# Patient Record
Sex: FEMALE | Race: BLACK OR AFRICAN AMERICAN | NOT HISPANIC OR LATINO
[De-identification: names, ages, dates, MRNs, and addresses within clinical notes are randomized per-mention and may not be internally consistent; named-entity substitution may affect disease eponyms.]

---

## 2023-07-21 PROBLEM — Z00.00 ENCOUNTER FOR PREVENTIVE HEALTH EXAMINATION: Status: ACTIVE | Noted: 2023-07-21

## 2023-08-09 PROBLEM — Z86.19 HISTORY OF HELICOBACTER PYLORI INFECTION: Status: RESOLVED | Noted: 2023-08-09 | Resolved: 2023-08-09

## 2023-08-09 PROBLEM — Z86.018 HISTORY OF UTERINE LEIOMYOMA: Status: RESOLVED | Noted: 2023-08-09 | Resolved: 2023-08-09

## 2023-08-09 PROBLEM — Z86.79 HISTORY OF HYPERTENSION: Status: RESOLVED | Noted: 2023-08-09 | Resolved: 2023-08-09

## 2023-08-09 PROBLEM — Z87.19 HISTORY OF GASTROESOPHAGEAL REFLUX (GERD): Status: RESOLVED | Noted: 2023-08-09 | Resolved: 2023-08-09

## 2023-08-09 PROBLEM — Z78.9 NON-SMOKER: Status: ACTIVE | Noted: 2023-08-09

## 2023-08-09 PROBLEM — Z86.59 HISTORY OF ANXIETY: Status: RESOLVED | Noted: 2023-08-09 | Resolved: 2023-08-09

## 2023-08-09 RX ORDER — CETIRIZINE HYDROCHLORIDE 10 MG/1
10 TABLET, COATED ORAL
Refills: 0 | Status: ACTIVE | COMMUNITY

## 2023-08-09 RX ORDER — FLUOXETINE HYDROCHLORIDE 20 MG/1
20 CAPSULE ORAL
Refills: 0 | Status: ACTIVE | COMMUNITY

## 2023-08-09 RX ORDER — LABETALOL HYDROCHLORIDE 100 MG/1
100 TABLET, FILM COATED ORAL
Refills: 0 | Status: ACTIVE | COMMUNITY

## 2023-08-09 RX ORDER — PANTOPRAZOLE 40 MG/1
40 TABLET, DELAYED RELEASE ORAL
Qty: 30 | Refills: 2 | Status: ACTIVE | COMMUNITY

## 2023-08-09 RX ORDER — AMLODIPINE BESYLATE 5 MG/1
5 TABLET ORAL
Refills: 0 | Status: ACTIVE | COMMUNITY

## 2023-08-10 NOTE — HISTORY OF PRESENT ILLNESS
[de-identified] : 47 year old female presents for an initial consultation for iron-deficiency anemia in setting of menorrhea, despite taking oral supplements.  She is referred by her PCP Amari Hudson for IV iron.  6/18/21: Hgb 9.5, MCV 84.5, MCHC 31 7/30/21: ferritin 3, iron 44, TIBC 481, transferrin sat 9% 2/1/22: Hgb 9.3, MCV 82.1, MCHC 30.7, ferritin 2, iron 30, TIBC 471, transferrin sat 6% 6/7/23: Hgb 9.9, MCV 83.3, MCHC 31, ferritin 2, iron 32, TIBC 448, transferrin sat 7%

## 2023-08-11 ENCOUNTER — APPOINTMENT (OUTPATIENT)
Dept: HEMATOLOGY ONCOLOGY | Facility: CLINIC | Age: 43
End: 2023-08-11
Payer: MEDICAID

## 2023-08-11 DIAGNOSIS — Z86.018 PERSONAL HISTORY OF OTHER BENIGN NEOPLASM: ICD-10-CM

## 2023-08-11 DIAGNOSIS — Z78.9 OTHER SPECIFIED HEALTH STATUS: ICD-10-CM

## 2023-08-11 DIAGNOSIS — Z86.59 PERSONAL HISTORY OF OTHER MENTAL AND BEHAVIORAL DISORDERS: ICD-10-CM

## 2023-08-11 DIAGNOSIS — Z86.19 PERSONAL HISTORY OF OTHER INFECTIOUS AND PARASITIC DISEASES: ICD-10-CM

## 2023-08-11 DIAGNOSIS — Z86.79 PERSONAL HISTORY OF OTHER DISEASES OF THE CIRCULATORY SYSTEM: ICD-10-CM

## 2023-08-11 DIAGNOSIS — Z87.19 PERSONAL HISTORY OF OTHER DISEASES OF THE DIGESTIVE SYSTEM: ICD-10-CM

## 2023-10-12 ENCOUNTER — LABORATORY RESULT (OUTPATIENT)
Age: 43
End: 2023-10-12

## 2023-10-12 ENCOUNTER — APPOINTMENT (OUTPATIENT)
Dept: HEMATOLOGY ONCOLOGY | Facility: CLINIC | Age: 43
End: 2023-10-12
Payer: MEDICAID

## 2023-10-12 VITALS
OXYGEN SATURATION: 100 % | HEART RATE: 71 BPM | RESPIRATION RATE: 18 BRPM | TEMPERATURE: 98.6 F | DIASTOLIC BLOOD PRESSURE: 98 MMHG | SYSTOLIC BLOOD PRESSURE: 154 MMHG | WEIGHT: 170 LBS

## 2023-10-12 VITALS — HEIGHT: 68 IN | BODY MASS INDEX: 25.85 KG/M2

## 2023-10-12 LAB
FERRITIN SERPL-MCNC: 5 NG/ML
HAPTOGLOB SERPL-MCNC: 46 MG/DL
IRON SATN MFR SERPL: 4 %
IRON SERPL-MCNC: 18 UG/DL
LDH SERPL-CCNC: 209 U/L
RBC # BLD: 3.62 M/UL
RETICS # AUTO: 1.8 %
RETICS AGGREG/RBC NFR: 65.2 K/UL
TIBC SERPL-MCNC: 435 UG/DL
UIBC SERPL-MCNC: 417 UG/DL

## 2023-10-12 PROCEDURE — 99204 OFFICE O/P NEW MOD 45 MIN: CPT

## 2023-10-13 LAB
ALBUMIN SERPL ELPH-MCNC: 3.5 G/DL
ALP BLD-CCNC: 51 U/L
ALT SERPL-CCNC: 12 U/L
ANION GAP SERPL CALC-SCNC: 4 MMOL/L
AST SERPL-CCNC: 24 U/L
BILIRUB SERPL-MCNC: 0.7 MG/DL
BUN SERPL-MCNC: 13 MG/DL
CALCIUM SERPL-MCNC: 9.3 MG/DL
CHLORIDE SERPL-SCNC: 110 MMOL/L
CO2 SERPL-SCNC: 25 MMOL/L
CREAT SERPL-MCNC: 0.7 MG/DL
EGFR: 111 ML/MIN/1.73M2
FOLATE SERPL-MCNC: 11.3 NG/ML
GLUCOSE SERPL-MCNC: 88 MG/DL
POTASSIUM SERPL-SCNC: 4.1 MMOL/L
PROT SERPL-MCNC: 7.2 G/DL
SODIUM SERPL-SCNC: 139 MMOL/L
VIT B12 SERPL-MCNC: 912 PG/ML

## 2023-10-19 RX ORDER — IRON SUCROSE 20 MG/ML
200 INJECTION, SOLUTION INTRAVENOUS ONCE
Refills: 0 | Status: COMPLETED | OUTPATIENT
Start: 2023-11-02 | End: 2023-11-02

## 2023-10-19 RX ORDER — IRON SUCROSE 20 MG/ML
200 INJECTION, SOLUTION INTRAVENOUS ONCE
Refills: 0 | Status: COMPLETED | OUTPATIENT
Start: 2023-10-26 | End: 2023-10-26

## 2023-10-19 RX ORDER — IRON SUCROSE 20 MG/ML
200 INJECTION, SOLUTION INTRAVENOUS ONCE
Refills: 0 | Status: COMPLETED | OUTPATIENT
Start: 2023-10-20 | End: 2023-10-20

## 2023-10-19 RX ORDER — IRON SUCROSE 20 MG/ML
200 INJECTION, SOLUTION INTRAVENOUS ONCE
Refills: 0 | Status: COMPLETED | OUTPATIENT
Start: 2023-12-07 | End: 2023-12-07

## 2023-10-19 RX ORDER — IRON SUCROSE 20 MG/ML
200 INJECTION, SOLUTION INTRAVENOUS ONCE
Refills: 0 | Status: COMPLETED | OUTPATIENT
Start: 2023-11-10 | End: 2023-11-10

## 2023-10-20 ENCOUNTER — OUTPATIENT (OUTPATIENT)
Dept: OUTPATIENT SERVICES | Facility: HOSPITAL | Age: 43
LOS: 1 days | End: 2023-10-20
Payer: MEDICAID

## 2023-10-20 ENCOUNTER — APPOINTMENT (OUTPATIENT)
Dept: INFUSION THERAPY | Facility: CLINIC | Age: 43
End: 2023-10-20

## 2023-10-20 DIAGNOSIS — D50.9 IRON DEFICIENCY ANEMIA, UNSPECIFIED: ICD-10-CM

## 2023-10-20 PROCEDURE — 96365 THER/PROPH/DIAG IV INF INIT: CPT

## 2023-10-20 RX ADMIN — IRON SUCROSE 110 MILLIGRAM(S): 20 INJECTION, SOLUTION INTRAVENOUS at 09:40

## 2023-10-20 RX ADMIN — IRON SUCROSE 200 MILLIGRAM(S): 20 INJECTION, SOLUTION INTRAVENOUS at 10:40

## 2023-10-26 ENCOUNTER — OUTPATIENT (OUTPATIENT)
Dept: OUTPATIENT SERVICES | Facility: HOSPITAL | Age: 43
LOS: 1 days | End: 2023-10-26
Payer: MEDICAID

## 2023-10-26 ENCOUNTER — APPOINTMENT (OUTPATIENT)
Dept: INFUSION THERAPY | Facility: CLINIC | Age: 43
End: 2023-10-26

## 2023-10-26 VITALS
OXYGEN SATURATION: 99 % | SYSTOLIC BLOOD PRESSURE: 146 MMHG | TEMPERATURE: 98 F | RESPIRATION RATE: 18 BRPM | DIASTOLIC BLOOD PRESSURE: 94 MMHG | HEART RATE: 78 BPM

## 2023-10-26 VITALS
SYSTOLIC BLOOD PRESSURE: 122 MMHG | OXYGEN SATURATION: 99 % | RESPIRATION RATE: 18 BRPM | TEMPERATURE: 98 F | DIASTOLIC BLOOD PRESSURE: 78 MMHG | HEART RATE: 71 BPM

## 2023-10-26 DIAGNOSIS — D50.9 IRON DEFICIENCY ANEMIA, UNSPECIFIED: ICD-10-CM

## 2023-10-26 PROCEDURE — 96365 THER/PROPH/DIAG IV INF INIT: CPT

## 2023-10-26 RX ADMIN — IRON SUCROSE 200 MILLIGRAM(S): 20 INJECTION, SOLUTION INTRAVENOUS at 16:11

## 2023-10-26 RX ADMIN — IRON SUCROSE 110 MILLIGRAM(S): 20 INJECTION, SOLUTION INTRAVENOUS at 15:11

## 2023-10-26 NOTE — DISCHARGE INSTRUCTIONS: GENERAL THERAPY - NSAPPTSFOLLOWUP_HEME_A_AMB
[facility]... [facility]... [facility]...

## 2023-10-26 NOTE — DISCHARGE INSTRUCTIONS: GENERAL THERAPY - NSFACILITYCONTAFTRHOUR_HEME_A_AMB
[facility] ([phone]) [facility] ([phone]) [facility] ([phone])

## 2023-11-02 ENCOUNTER — OUTPATIENT (OUTPATIENT)
Dept: OUTPATIENT SERVICES | Facility: HOSPITAL | Age: 43
LOS: 1 days | End: 2023-11-02
Payer: MEDICAID

## 2023-11-02 ENCOUNTER — APPOINTMENT (OUTPATIENT)
Dept: INFUSION THERAPY | Facility: CLINIC | Age: 43
End: 2023-11-02

## 2023-11-02 DIAGNOSIS — D50.9 IRON DEFICIENCY ANEMIA, UNSPECIFIED: ICD-10-CM

## 2023-11-02 PROCEDURE — 96365 THER/PROPH/DIAG IV INF INIT: CPT

## 2023-11-02 RX ADMIN — IRON SUCROSE 200 MILLIGRAM(S): 20 INJECTION, SOLUTION INTRAVENOUS at 10:50

## 2023-11-02 RX ADMIN — IRON SUCROSE 110 MILLIGRAM(S): 20 INJECTION, SOLUTION INTRAVENOUS at 09:50

## 2023-11-02 NOTE — DISCHARGE INSTRUCTIONS: GENERAL THERAPY - NSFACILITYCONTAFTRHOUR_HEME_A_AMB
[facility] ([phone]) [facility] ([phone]) [facility] ([phone])

## 2023-11-02 NOTE — DISCHARGE INSTRUCTIONS: GENERAL THERAPY - NSAPPTSFOLLOWUP_HEME_A_AMB
[facility]... [facility]... [facility]...

## 2023-11-10 ENCOUNTER — APPOINTMENT (OUTPATIENT)
Dept: INFUSION THERAPY | Facility: CLINIC | Age: 43
End: 2023-11-10

## 2023-11-10 ENCOUNTER — OUTPATIENT (OUTPATIENT)
Dept: OUTPATIENT SERVICES | Facility: HOSPITAL | Age: 43
LOS: 1 days | End: 2023-11-10
Payer: MEDICAID

## 2023-11-10 VITALS
WEIGHT: 179.9 LBS | TEMPERATURE: 98 F | RESPIRATION RATE: 16 BRPM | SYSTOLIC BLOOD PRESSURE: 160 MMHG | DIASTOLIC BLOOD PRESSURE: 99 MMHG | OXYGEN SATURATION: 99 % | HEIGHT: 68 IN | HEART RATE: 69 BPM

## 2023-11-10 VITALS
DIASTOLIC BLOOD PRESSURE: 91 MMHG | OXYGEN SATURATION: 100 % | RESPIRATION RATE: 20 BRPM | SYSTOLIC BLOOD PRESSURE: 148 MMHG

## 2023-11-10 DIAGNOSIS — D50.9 IRON DEFICIENCY ANEMIA, UNSPECIFIED: ICD-10-CM

## 2023-11-10 PROCEDURE — 96365 THER/PROPH/DIAG IV INF INIT: CPT

## 2023-11-10 RX ADMIN — IRON SUCROSE 200 MILLIGRAM(S): 20 INJECTION, SOLUTION INTRAVENOUS at 11:30

## 2023-11-10 RX ADMIN — IRON SUCROSE 110 MILLIGRAM(S): 20 INJECTION, SOLUTION INTRAVENOUS at 10:45

## 2023-11-10 NOTE — DISCHARGE INSTRUCTIONS: GENERAL THERAPY - NSFACILITYCONTAFTRHOUR_HEME_A_AMB
[facility] ([phone]) [facility] ([phone]) [facility] ([phone])

## 2023-11-10 NOTE — DISCHARGE INSTRUCTIONS: GENERAL THERAPY - NSAPPTSFOLLOWUP_HEME_A_AMB
[facility]... [facility]... [facility]...

## 2023-12-07 ENCOUNTER — OUTPATIENT (OUTPATIENT)
Dept: OUTPATIENT SERVICES | Facility: HOSPITAL | Age: 43
LOS: 1 days | End: 2023-12-07
Payer: MEDICAID

## 2023-12-07 ENCOUNTER — APPOINTMENT (OUTPATIENT)
Dept: INFUSION THERAPY | Facility: CLINIC | Age: 43
End: 2023-12-07

## 2023-12-07 VITALS
SYSTOLIC BLOOD PRESSURE: 122 MMHG | OXYGEN SATURATION: 99 % | DIASTOLIC BLOOD PRESSURE: 69 MMHG | HEART RATE: 80 BPM | TEMPERATURE: 98 F | RESPIRATION RATE: 18 BRPM

## 2023-12-07 VITALS
RESPIRATION RATE: 18 BRPM | SYSTOLIC BLOOD PRESSURE: 133 MMHG | OXYGEN SATURATION: 98 % | DIASTOLIC BLOOD PRESSURE: 88 MMHG | HEART RATE: 74 BPM | TEMPERATURE: 98 F

## 2023-12-07 DIAGNOSIS — D50.9 IRON DEFICIENCY ANEMIA, UNSPECIFIED: ICD-10-CM

## 2023-12-07 PROCEDURE — 96365 THER/PROPH/DIAG IV INF INIT: CPT

## 2023-12-07 RX ADMIN — IRON SUCROSE 110 MILLIGRAM(S): 20 INJECTION, SOLUTION INTRAVENOUS at 09:10

## 2023-12-07 RX ADMIN — IRON SUCROSE 200 MILLIGRAM(S): 20 INJECTION, SOLUTION INTRAVENOUS at 10:15

## 2023-12-07 NOTE — DISCHARGE INSTRUCTIONS: GENERAL THERAPY - NSFACILITYCONTAFTRHOUR_HEME_A_AMB
[facility] ([phone]) [facility] ([phone])

## 2024-01-11 ENCOUNTER — LABORATORY RESULT (OUTPATIENT)
Age: 44
End: 2024-01-11

## 2024-01-11 ENCOUNTER — NON-APPOINTMENT (OUTPATIENT)
Age: 44
End: 2024-01-11

## 2024-01-11 ENCOUNTER — APPOINTMENT (OUTPATIENT)
Dept: HEMATOLOGY ONCOLOGY | Facility: CLINIC | Age: 44
End: 2024-01-11
Payer: MEDICAID

## 2024-01-11 VITALS
SYSTOLIC BLOOD PRESSURE: 158 MMHG | OXYGEN SATURATION: 99 % | RESPIRATION RATE: 18 BRPM | HEIGHT: 68 IN | DIASTOLIC BLOOD PRESSURE: 101 MMHG | HEART RATE: 70 BPM | WEIGHT: 178 LBS | BODY MASS INDEX: 26.98 KG/M2 | TEMPERATURE: 97.1 F

## 2024-01-11 VITALS — SYSTOLIC BLOOD PRESSURE: 160 MMHG | DIASTOLIC BLOOD PRESSURE: 106 MMHG

## 2024-01-11 DIAGNOSIS — D50.9 IRON DEFICIENCY ANEMIA, UNSPECIFIED: ICD-10-CM

## 2024-01-11 LAB
FERRITIN SERPL-MCNC: 22 NG/ML
IRON SATN MFR SERPL: 11 %
IRON SERPL-MCNC: 39 UG/DL
RBC # BLD: 4.05 M/UL
RETICS # AUTO: 3.2 %
RETICS AGGREG/RBC NFR: 127.6 K/UL
TIBC SERPL-MCNC: 359 UG/DL
UIBC SERPL-MCNC: 320 UG/DL

## 2024-01-11 PROCEDURE — 99213 OFFICE O/P EST LOW 20 MIN: CPT

## 2024-01-11 RX ORDER — CHLORHEXIDINE GLUCONATE 4 %
325 (65 FE) LIQUID (ML) TOPICAL DAILY
Qty: 30 | Refills: 3 | Status: ACTIVE | COMMUNITY
Start: 1900-01-01 | End: 1900-01-01

## 2024-01-11 NOTE — ASSESSMENT
[FreeTextEntry1] : 43-year-old female with history of iron deficiency anemia due to heavy menses here for follow up.  VERONICA 2/2 menorrhagia Completed 5 infusions of IV Venofer, last on 12/7/2023. Hb much improved and so is iron C/w Fe sulfate daily RTC in 3 months Repeat labs at that time: CBC, iron panel Advised to see GYN asap for fibroid removal   RTC in 3 months.

## 2024-01-11 NOTE — HISTORY OF PRESENT ILLNESS
[de-identified] : 43 year old female presents for an initial consultation for iron-deficiency anemia in setting of menorrhea, despite taking oral supplements.  She is referred by her PCP Amari Hudson for IV iron.  Heme hx: She reports a history of heavy menses.  She saw gynecologist before was told that in order to control her.  She would need to be on contraceptives, which she does not want because she is trying to get pregnant. She never received a blood transfusion in the past.  She received IV iron several years ago after which she felt much better. Currently when she has her.,  It last 7 days and she uses 10 pads per day at least. No family history of anemia. 6/18/21: Hgb 9.5, MCV 84.5, MCHC 31 7/30/21: ferritin 3, iron 44, TIBC 481, transferrin sat 9% 2/1/22: Hgb 9.3, MCV 82.1, MCHC 30.7, ferritin 2, iron 30, TIBC 471, transferrin sat 6% 6/7/23: Hgb 9.9, MCV 83.3, MCHC 31, ferritin 2, iron 32, TIBC 448, transferrin sat 7% 1/11/2024: Hgb 10.9, MCV 86.2, MCHC 26.8, ferritin 22, iron sat 11% [de-identified] : The patient reports feeling sometimes like, denies any shortness of breath or chest pain.  She has ice cravings.

## 2024-01-11 NOTE — REASON FOR VISIT
[Initial Consultation] : an initial consultation for [Follow-Up Visit] : a follow-up visit for [FreeTextEntry2] : anemia

## 2024-01-12 LAB
FOLATE SERPL-MCNC: 18 NG/ML
VIT B12 SERPL-MCNC: 909 PG/ML

## 2024-04-08 NOTE — HISTORY OF PRESENT ILLNESS
[de-identified] : 43 year old female presents for an initial consultation for iron-deficiency anemia in setting of menorrhea, despite taking oral supplements.  She is referred by her PCP Amari Hudson for IV iron.  Heme hx: She reports a history of heavy menses.  She saw gynecologist before was told that in order to control her.  She would need to be on contraceptives, which she does not want because she is trying to get pregnant. She never received a blood transfusion in the past.  She received IV iron several years ago after which she felt much better. Currently when she has her.,  It last 7 days and she uses 10 pads per day at least. No family history of anemia. 6/18/21: Hgb 9.5, MCV 84.5, MCHC 31 7/30/21: ferritin 3, iron 44, TIBC 481, transferrin sat 9% 2/1/22: Hgb 9.3, MCV 82.1, MCHC 30.7, ferritin 2, iron 30, TIBC 471, transferrin sat 6% 6/7/23: Hgb 9.9, MCV 83.3, MCHC 31, ferritin 2, iron 32, TIBC 448, transferrin sat 7% 1/11/2024: Hgb 10.9, MCV 86.2, MCHC 26.8, ferritin 22, iron sat 11% [de-identified] : The patient reports feeling sometimes like, denies any shortness of breath or chest pain.  She has ice cravings.

## 2024-05-06 ENCOUNTER — APPOINTMENT (OUTPATIENT)
Dept: HEMATOLOGY ONCOLOGY | Facility: CLINIC | Age: 44
End: 2024-05-06